# Patient Record
Sex: FEMALE | Race: WHITE | NOT HISPANIC OR LATINO | URBAN - METROPOLITAN AREA
[De-identification: names, ages, dates, MRNs, and addresses within clinical notes are randomized per-mention and may not be internally consistent; named-entity substitution may affect disease eponyms.]

---

## 2017-01-05 ENCOUNTER — APPOINTMENT (OUTPATIENT)
Dept: URBAN - METROPOLITAN AREA CLINIC 219 | Age: 62
Setting detail: DERMATOLOGY
End: 2017-01-05

## 2017-01-05 DIAGNOSIS — L98429 CHRONIC ULCER OF OTHER SPECIFIED SITES: ICD-10-CM

## 2017-01-05 DIAGNOSIS — B35.1 TINEA UNGUIUM: ICD-10-CM

## 2017-01-05 DIAGNOSIS — L98419 CHRONIC ULCER OF OTHER SPECIFIED SITES: ICD-10-CM

## 2017-01-05 DIAGNOSIS — L30.4 ERYTHEMA INTERTRIGO: ICD-10-CM

## 2017-01-05 PROBLEM — L98.491 NON-PRESSURE CHRONIC ULCER OF SKIN OF OTHER SITES LIMITED TO BREAKDOWN OF SKIN: Status: ACTIVE | Noted: 2017-01-05

## 2017-01-05 PROCEDURE — OTHER TREATMENT REGIMEN: OTHER

## 2017-01-05 PROCEDURE — 99212 OFFICE O/P EST SF 10 MIN: CPT

## 2017-01-05 ASSESSMENT — LOCATION ZONE DERM: LOCATION ZONE: TOENAIL

## 2017-01-05 ASSESSMENT — LOCATION DETAILED DESCRIPTION DERM: LOCATION DETAILED: LEFT GREAT TOENAIL

## 2017-01-05 ASSESSMENT — LOCATION SIMPLE DESCRIPTION DERM: LOCATION SIMPLE: LEFT GREAT TOE

## 2017-01-05 NOTE — PROCEDURE: TREATMENT REGIMEN
Plan: Ciclopirox TS twice a day as needed or Penlac Lacquer prescribed by Dr. Abebe
Detail Level: Simple
Plan: Ciclopirox TS twice a day as needed
Otc Regimen: Zeasorb AF Powder twice a day
Detail Level: Detailed

## 2017-07-06 ENCOUNTER — APPOINTMENT (OUTPATIENT)
Dept: URBAN - METROPOLITAN AREA CLINIC 219 | Age: 62
Setting detail: DERMATOLOGY
End: 2017-07-06

## 2017-07-06 DIAGNOSIS — L91.8 OTHER HYPERTROPHIC DISORDERS OF THE SKIN: ICD-10-CM

## 2017-07-06 DIAGNOSIS — L57.0 ACTINIC KERATOSIS: ICD-10-CM

## 2017-07-06 DIAGNOSIS — B35.1 TINEA UNGUIUM: ICD-10-CM

## 2017-07-06 DIAGNOSIS — L72.0 EPIDERMAL CYST: ICD-10-CM

## 2017-07-06 DIAGNOSIS — L30.4 ERYTHEMA INTERTRIGO: ICD-10-CM

## 2017-07-06 PROBLEM — I10 ESSENTIAL (PRIMARY) HYPERTENSION: Status: ACTIVE | Noted: 2017-07-06

## 2017-07-06 PROCEDURE — OTHER REASSURANCE: OTHER

## 2017-07-06 PROCEDURE — 99214 OFFICE O/P EST MOD 30 MIN: CPT | Mod: 25

## 2017-07-06 PROCEDURE — 17000 DESTRUCT PREMALG LESION: CPT

## 2017-07-06 PROCEDURE — OTHER LIQUID NITROGEN: OTHER

## 2017-07-06 PROCEDURE — OTHER TREATMENT REGIMEN: OTHER

## 2017-07-06 ASSESSMENT — LOCATION DETAILED DESCRIPTION DERM
LOCATION DETAILED: RIGHT AXILLARY VAULT
LOCATION DETAILED: RIGHT EYEBROW
LOCATION DETAILED: LEFT AXILLARY VAULT
LOCATION DETAILED: LEFT SUPERIOR FOREHEAD
LOCATION DETAILED: LEFT GREAT TOENAIL

## 2017-07-06 ASSESSMENT — LOCATION ZONE DERM
LOCATION ZONE: AXILLAE
LOCATION ZONE: TOENAIL
LOCATION ZONE: FACE

## 2017-07-06 ASSESSMENT — LOCATION SIMPLE DESCRIPTION DERM
LOCATION SIMPLE: LEFT AXILLARY VAULT
LOCATION SIMPLE: LEFT GREAT TOE
LOCATION SIMPLE: RIGHT AXILLARY VAULT
LOCATION SIMPLE: RIGHT EYEBROW
LOCATION SIMPLE: LEFT FOREHEAD

## 2017-07-06 NOTE — PROCEDURE: TREATMENT REGIMEN
Detail Level: Simple
Otc Regimen: Zeasorb AF Powder twice a day
Continue Regimen: Ciclopirox TS twice a day as needed
Detail Level: Detailed

## 2018-03-20 ENCOUNTER — APPOINTMENT (OUTPATIENT)
Dept: URBAN - METROPOLITAN AREA CLINIC 219 | Age: 63
Setting detail: DERMATOLOGY
End: 2018-03-20

## 2018-03-20 DIAGNOSIS — L82.1 OTHER SEBORRHEIC KERATOSIS: ICD-10-CM

## 2018-03-20 DIAGNOSIS — B35.1 TINEA UNGUIUM: ICD-10-CM

## 2018-03-20 DIAGNOSIS — H02.82 CYSTS OF EYELID: ICD-10-CM

## 2018-03-20 DIAGNOSIS — L30.4 ERYTHEMA INTERTRIGO: ICD-10-CM

## 2018-03-20 PROBLEM — E03.9 HYPOTHYROIDISM, UNSPECIFIED: Status: ACTIVE | Noted: 2018-03-20

## 2018-03-20 PROBLEM — L55.1 SUNBURN OF SECOND DEGREE: Status: ACTIVE | Noted: 2018-03-20

## 2018-03-20 PROBLEM — H02.822 CYSTS OF RIGHT LOWER EYELID: Status: ACTIVE | Noted: 2018-03-20

## 2018-03-20 PROBLEM — Z85.3 PERSONAL HISTORY OF MALIGNANT NEOPLASM OF BREAST: Status: ACTIVE | Noted: 2018-03-20

## 2018-03-20 PROCEDURE — OTHER REASSURANCE: OTHER

## 2018-03-20 PROCEDURE — OTHER MIPS QUALITY: OTHER

## 2018-03-20 PROCEDURE — OTHER TREATMENT REGIMEN: OTHER

## 2018-03-20 PROCEDURE — OTHER INCISION AND DRAINAGE: OTHER

## 2018-03-20 PROCEDURE — 99213 OFFICE O/P EST LOW 20 MIN: CPT | Mod: 25

## 2018-03-20 PROCEDURE — 10060 I&D ABSCESS SIMPLE/SINGLE: CPT

## 2018-03-20 ASSESSMENT — LOCATION SIMPLE DESCRIPTION DERM
LOCATION SIMPLE: RIGHT TEMPLE
LOCATION SIMPLE: LEFT GREAT TOE
LOCATION SIMPLE: RIGHT INFERIOR EYELID

## 2018-03-20 ASSESSMENT — LOCATION DETAILED DESCRIPTION DERM
LOCATION DETAILED: RIGHT MEDIAL INFERIOR EYELID
LOCATION DETAILED: RIGHT MEDIAL TEMPLE
LOCATION DETAILED: LEFT GREAT TOENAIL

## 2018-03-20 ASSESSMENT — LOCATION ZONE DERM
LOCATION ZONE: TOENAIL
LOCATION ZONE: EYELID
LOCATION ZONE: FACE

## 2018-03-20 NOTE — PROCEDURE: INCISION AND DRAINAGE
Include Sutures?: No
Dressing: no dressing
Preparation Text: The area was prepped in the usual clean fashion.
Post-Care Instructions: I reviewed with the patient in detail post-care instructions. Patient should keep wound covered and call the office should any redness, pain, swelling or worsening occur.
Epidermal Sutures: 4-0 Ethilon
Lesion Type: Cyst
Consent was obtained and risks were reviewed including but not limited to delayed wound healing, infection, need for multiple I and D's, and pain.
Anesthesia Volume In Cc: 0
Suture Text: The incision was partially closed with
Detail Level: Detailed
Epidermal Closure: simple interrupted
Method: 11 blade
Size Of Lesion In Cm (Optional But May Be Required For Some Insurances): 0.2
Curette Text (Optional): After the contents were expressed a curette was used to partially remove the cyst wall.

## 2018-03-20 NOTE — PROCEDURE: TREATMENT REGIMEN
Detail Level: Simple
Detail Level: Detailed
Continue Regimen: Ciclopirox TS twice a day as needed\\nZeasorb AF Powder twice a day
Continue Regimen: Ciclopirox TS twice a day as needed

## 2018-09-18 ENCOUNTER — APPOINTMENT (OUTPATIENT)
Dept: URBAN - METROPOLITAN AREA CLINIC 219 | Age: 63
Setting detail: DERMATOLOGY
End: 2018-09-18

## 2018-09-18 DIAGNOSIS — L30.4 ERYTHEMA INTERTRIGO: ICD-10-CM

## 2018-09-18 DIAGNOSIS — B35.1 TINEA UNGUIUM: ICD-10-CM

## 2018-09-18 DIAGNOSIS — L72.0 EPIDERMAL CYST: ICD-10-CM

## 2018-09-18 DIAGNOSIS — L82.1 OTHER SEBORRHEIC KERATOSIS: ICD-10-CM

## 2018-09-18 DIAGNOSIS — D18.0 HEMANGIOMA: ICD-10-CM

## 2018-09-18 DIAGNOSIS — L70.8 OTHER ACNE: ICD-10-CM

## 2018-09-18 PROBLEM — D18.01 HEMANGIOMA OF SKIN AND SUBCUTANEOUS TISSUE: Status: ACTIVE | Noted: 2018-09-18

## 2018-09-18 PROBLEM — I10 ESSENTIAL (PRIMARY) HYPERTENSION: Status: ACTIVE | Noted: 2018-09-18

## 2018-09-18 PROBLEM — I48.91 UNSPECIFIED ATRIAL FIBRILLATION: Status: ACTIVE | Noted: 2018-09-18

## 2018-09-18 PROBLEM — L85.3 XEROSIS CUTIS: Status: ACTIVE | Noted: 2018-09-18

## 2018-09-18 PROCEDURE — OTHER TREATMENT REGIMEN: OTHER

## 2018-09-18 PROCEDURE — 99213 OFFICE O/P EST LOW 20 MIN: CPT

## 2018-09-18 PROCEDURE — OTHER REASSURANCE: OTHER

## 2018-09-18 PROCEDURE — OTHER COUNSELING: OTHER

## 2018-09-18 ASSESSMENT — LOCATION SIMPLE DESCRIPTION DERM
LOCATION SIMPLE: LEFT GREAT TOE
LOCATION SIMPLE: RIGHT PRETIBIAL REGION
LOCATION SIMPLE: RIGHT INFERIOR EYELID
LOCATION SIMPLE: UPPER BACK
LOCATION SIMPLE: LEFT KNEE
LOCATION SIMPLE: RIGHT LOWER BACK
LOCATION SIMPLE: CHEST

## 2018-09-18 ASSESSMENT — LOCATION DETAILED DESCRIPTION DERM
LOCATION DETAILED: RIGHT LATERAL INFERIOR EYELID
LOCATION DETAILED: LEFT KNEE
LOCATION DETAILED: RIGHT INFERIOR MEDIAL MIDBACK
LOCATION DETAILED: INFERIOR THORACIC SPINE
LOCATION DETAILED: LEFT GREAT TOENAIL
LOCATION DETAILED: RIGHT LATERAL SUPERIOR CHEST
LOCATION DETAILED: RIGHT DISTAL PRETIBIAL REGION

## 2018-09-18 ASSESSMENT — LOCATION ZONE DERM
LOCATION ZONE: LEG
LOCATION ZONE: EYELID
LOCATION ZONE: TRUNK
LOCATION ZONE: TOENAIL

## 2021-04-01 ENCOUNTER — APPOINTMENT (OUTPATIENT)
Dept: URBAN - NONMETROPOLITAN AREA CLINIC 45 | Age: 66
Setting detail: DERMATOLOGY
End: 2021-04-01

## 2021-04-01 DIAGNOSIS — L30.4 ERYTHEMA INTERTRIGO: ICD-10-CM

## 2021-04-01 DIAGNOSIS — L72.8 OTHER FOLLICULAR CYSTS OF THE SKIN AND SUBCUTANEOUS TISSUE: ICD-10-CM

## 2021-04-01 PROCEDURE — OTHER INCISION AND DRAINAGE: OTHER

## 2021-04-01 PROCEDURE — 99212 OFFICE O/P EST SF 10 MIN: CPT | Mod: 25

## 2021-04-01 PROCEDURE — 10061 I&D ABSCESS COMP/MULTIPLE: CPT

## 2021-04-01 PROCEDURE — OTHER TREATMENT REGIMEN: OTHER

## 2021-04-01 PROCEDURE — OTHER PRESCRIPTION: OTHER

## 2021-04-01 RX ORDER — CICLOPIROX OLAMINE 7.7 MG/100ML
APPLY SUSPENSION TOPICAL TWICE A DAY
Qty: 1 | Refills: 5 | Status: ERX | COMMUNITY
Start: 2021-04-01

## 2021-04-01 ASSESSMENT — LOCATION ZONE DERM
LOCATION ZONE: FACE
LOCATION ZONE: LIP

## 2021-04-01 ASSESSMENT — LOCATION SIMPLE DESCRIPTION DERM
LOCATION SIMPLE: LEFT CHEEK
LOCATION SIMPLE: LEFT LIP

## 2021-04-01 ASSESSMENT — LOCATION DETAILED DESCRIPTION DERM
LOCATION DETAILED: LEFT MEDIAL BUCCAL CHEEK
LOCATION DETAILED: LEFT LOWER CUTANEOUS LIP

## 2021-04-01 NOTE — PROCEDURE: INCISION AND DRAINAGE
Method: 11 blade
Curette Text (Optional): After the contents were expressed a curette was used to partially remove the cyst wall.
Detail Level: Detailed
I&D: multiple
Consent was obtained and risks were reviewed including but not limited to delayed wound healing, infection, need for multiple I and D's, and pain.
Anesthesia Volume In Cc: 0
Include Sutures?: No
Epidermal Sutures: 4-0 Ethilon
Dressing: dry sterile dressing
Suture Text: The incision was partially closed with
Post-Care Instructions: I reviewed with the patient in detail post-care instructions. Patient should keep wound covered and call the office should any redness, pain, swelling or worsening occur.
Lesion Type: Cysts
Size Of Lesion In Cm (Optional But May Be Required For Some Insurances): 0.4
Epidermal Closure: simple interrupted
Wound Care: Polysporin ointment

## 2021-04-01 NOTE — PROCEDURE: TREATMENT REGIMEN
Detail Level: Simple
Continue Regimen: Ciclopirox TS twice a day as needed\\nZeasorb AF Powder twice a day

## 2021-04-30 ENCOUNTER — OUT OF OFFICE VISIT (OUTPATIENT)
Dept: URBAN - METROPOLITAN AREA MEDICAL CENTER 1 | Facility: MEDICAL CENTER | Age: 66
End: 2021-04-30
Payer: MEDICARE

## 2021-04-30 DIAGNOSIS — R10.84 ABDOMINAL CRAMPING, GENERALIZED: ICD-10-CM

## 2021-04-30 DIAGNOSIS — R93.3 ABN FINDINGS-GI TRACT: ICD-10-CM

## 2021-04-30 DIAGNOSIS — N39.0 CHRONIC UTI: ICD-10-CM

## 2021-04-30 PROCEDURE — G8427 DOCREV CUR MEDS BY ELIG CLIN: HCPCS | Performed by: INTERNAL MEDICINE

## 2021-04-30 PROCEDURE — 99222 1ST HOSP IP/OBS MODERATE 55: CPT | Performed by: INTERNAL MEDICINE

## 2021-04-30 PROCEDURE — 99232 SBSQ HOSP IP/OBS MODERATE 35: CPT | Performed by: INTERNAL MEDICINE

## 2021-05-12 ENCOUNTER — LAB OUTSIDE AN ENCOUNTER (OUTPATIENT)
Dept: URBAN - NONMETROPOLITAN AREA CLINIC 13 | Facility: CLINIC | Age: 66
End: 2021-05-12

## 2021-05-12 ENCOUNTER — OFFICE VISIT (OUTPATIENT)
Dept: URBAN - NONMETROPOLITAN AREA CLINIC 13 | Facility: CLINIC | Age: 66
End: 2021-05-12
Payer: MEDICARE

## 2021-05-12 VITALS
HEIGHT: 64 IN | BODY MASS INDEX: 45.75 KG/M2 | SYSTOLIC BLOOD PRESSURE: 123 MMHG | WEIGHT: 268 LBS | HEART RATE: 81 BPM | DIASTOLIC BLOOD PRESSURE: 81 MMHG

## 2021-05-12 DIAGNOSIS — K52.9 ILEITIS: ICD-10-CM

## 2021-05-12 DIAGNOSIS — Z79.02 LONG TERM (CURRENT) USE OF ANTITHROMBOTICS/ANTIPLATELETS: ICD-10-CM

## 2021-05-12 DIAGNOSIS — E66.9 CLASS 3 OBESITY: ICD-10-CM

## 2021-05-12 DIAGNOSIS — K76.89 HEPATIC CYST: ICD-10-CM

## 2021-05-12 DIAGNOSIS — Z86.010 PERSONAL HISTORY OF COLONIC POLYPS: ICD-10-CM

## 2021-05-12 PROCEDURE — 99214 OFFICE O/P EST MOD 30 MIN: CPT | Performed by: INTERNAL MEDICINE

## 2021-05-12 RX ORDER — METRONIDAZOLE 500 MG/1
1 TABLET TABLET ORAL THREE TIMES A DAY
Status: DISCONTINUED | COMMUNITY

## 2021-05-12 RX ORDER — ATENOLOL 25 MG/1
1 TABLET TABLET ORAL ONCE A DAY
Status: ACTIVE | COMMUNITY

## 2021-05-12 RX ORDER — DILTIAZEM HYDROCHLORIDE 240 MG/1
1 CAPSULE CAPSULE, EXTENDED RELEASE ORAL ONCE A DAY
Status: ACTIVE | COMMUNITY

## 2021-05-12 RX ORDER — WARFARIN SODIUM 2.5 MG/1
1 TABLET TABLET ORAL ONCE A DAY
Status: ACTIVE | COMMUNITY

## 2021-05-12 RX ORDER — POLYETHYLENE GLYCOL 3350, SODIUM SULFATE, SODIUM CHLORIDE, POTASSIUM CHLORIDE, ASCORBIC ACID, SODIUM ASCORBATE 140-9-5.2G
AS DIRECTED KIT ORAL AS DIRECTED
Qty: 280 GRAM | Refills: 0 | OUTPATIENT
Start: 2021-05-12 | End: 2021-05-13

## 2021-05-12 RX ORDER — SIMVASTATIN 10 MG/1
1 TABLET IN THE EVENING TABLET, FILM COATED ORAL ONCE A DAY
Status: ACTIVE | COMMUNITY

## 2021-05-12 RX ORDER — HYDROCHLOROTHIAZIDE 12.5 MG/1
1 CAPSULE IN THE MORNING CAPSULE ORAL ONCE A DAY
Status: ACTIVE | COMMUNITY

## 2021-05-12 RX ORDER — LEVOTHYROXINE SODIUM 0.11 MG/1
1 TABLET IN THE MORNING ON AN EMPTY STOMACH TABLET ORAL ONCE A DAY
Status: ACTIVE | COMMUNITY

## 2021-05-12 NOTE — HPI-TODAY'S VISIT:
2021: Initial Gastroenterology Clinic Visit 65 year old female with past medical history of hypertension, atrial fibrillation on coumadin, breast cancer, diverticulosis, who presents following hospitalization at [facility] from 2021 to May 1, 2021 for right lower quadrant abdominal with findings of terminal ileitis.   Ms. Fitzgerald was in her usual state of health until two days prior to hospitalization when she ate a frozen pizza that led to an upset stomach. She presented to an outside facility and underwent a CT Abdomen/Pelvis which showed evidence of inflammatory changes adjacent to the distal abdominal right ureter secondary to process involving the distal small bowel and cecum with inflammatory changes suggesting ileitis . She was subsequently admitted to [facility].  At [facility], she was unable to produce a stool sample. She was empirically treated with metronidazole + ceftriaxone during hospitalization and was discharged on cefdinir + metronidazole.  At today's visit, she notes her abdominal discomfort has completely resolved. She denies family history of inflammatory bowel disease. She does use coumadin. She denies use of NSAIDs. She has been tolerating a diet since discharge.   Her most recent colonoscopy was performed 10/10/2017 which showed a cecal polyp opposite the ileocecal valve 4 mm sessile treated by biopsy ablation (pathology revealed tubular adenoma), mid sigmoid polyp 4 mm sessile treated by biopsy ablation (pathology revealed hyperplastic polyp), internal hemorrhoids.   Please see below for most recent abdominal impain2021: MRI Liver IMPRESSION: 1. Cyst anterior inferior right lobe of the liver is likely the lesion described on the patient's CT. Other than some minimal anterior septation and appears simple. There also appears to be another tiny cyst slightly more posterior superior aspect inferior right lobe of the liver. 2. MRCP degraded by breathing motion. No biliary dilatation or abnormality of the gallbladder appreciated. 3. Other incidental findings as described.

## 2021-05-12 NOTE — PHYSICAL EXAM GASTROINTESTINAL
Abdomen , Central adiposity appreciated , soft,  nontender, nondistended , no guarding or rigidity , no masses palpable , normal bowel sounds , Liver and Spleen , no hepatomegaly present , no hepatosplenomegaly , liver nontender , spleen not palpable

## 2021-05-13 LAB
A/G RATIO: 1.6
ALBUMIN: 4.1
ALKALINE PHOSPHATASE: 77
ALT (SGPT): 13
AST (SGOT): 24
BASO (ABSOLUTE): 0.1
BASOS: 1
BILIRUBIN, TOTAL: 0.3
BUN/CREATININE RATIO: 20
BUN: 17
C-REACTIVE PROTEIN, QUANT: 9
CALCIUM: 9.8
CARBON DIOXIDE, TOTAL: 23
CHLORIDE: 100
CREATININE: 0.86
EGFR IF AFRICN AM: 81
EGFR IF NONAFRICN AM: 70
EOS (ABSOLUTE): 0.2
EOS: 2
GLOBULIN, TOTAL: 2.5
GLUCOSE: 105
HEMATOCRIT: 48.3
HEMATOLOGY COMMENTS:: (no result)
HEMOGLOBIN: 16
IMMATURE CELLS: (no result)
IMMATURE GRANS (ABS): 0
IMMATURE GRANULOCYTES: 1
LYMPHS (ABSOLUTE): 1.3
LYMPHS: 18
MCH: 29.9
MCHC: 33.1
MCV: 90
MONOCYTES(ABSOLUTE): 0.9
MONOCYTES: 12
NEUTROPHILS (ABSOLUTE): 5.2
NEUTROPHILS: 66
NRBC: (no result)
PLATELETS: 378
POTASSIUM: 4.5
PROTEIN, TOTAL: 6.6
RBC: 5.36
RDW: 14.1
SODIUM: 140
WBC: 7.7

## 2021-07-01 ENCOUNTER — CLAIMS CREATED FROM THE CLAIM WINDOW (OUTPATIENT)
Dept: URBAN - METROPOLITAN AREA CLINIC 4 | Facility: CLINIC | Age: 66
End: 2021-07-01
Payer: MEDICARE

## 2021-07-01 ENCOUNTER — OFFICE VISIT (OUTPATIENT)
Dept: URBAN - NONMETROPOLITAN AREA SURGERY CENTER 1 | Facility: SURGERY CENTER | Age: 66
End: 2021-07-01
Payer: MEDICARE

## 2021-07-01 DIAGNOSIS — K31.89 GASTRIC FOVEOLAR HYPERPLASIA: ICD-10-CM

## 2021-07-01 DIAGNOSIS — K63.5 BENIGN COLON POLYP: ICD-10-CM

## 2021-07-01 DIAGNOSIS — D12.2 ADENOMA OF ASCENDING COLON: ICD-10-CM

## 2021-07-01 DIAGNOSIS — K63.89 POLYP OF ILEUM: ICD-10-CM

## 2021-07-01 DIAGNOSIS — D12.2 BENIGN NEOPLASM OF ASCENDING COLON: ICD-10-CM

## 2021-07-01 DIAGNOSIS — R93.3 ABN FINDINGS-GI TRACT: ICD-10-CM

## 2021-07-01 PROCEDURE — 45380 COLONOSCOPY AND BIOPSY: CPT | Performed by: INTERNAL MEDICINE

## 2021-07-01 PROCEDURE — G8907 PT DOC NO EVENTS ON DISCHARG: HCPCS | Performed by: INTERNAL MEDICINE

## 2021-07-01 PROCEDURE — 45385 COLONOSCOPY W/LESION REMOVAL: CPT | Performed by: INTERNAL MEDICINE

## 2021-07-01 PROCEDURE — 88305 TISSUE EXAM BY PATHOLOGIST: CPT | Performed by: PATHOLOGY

## 2021-07-01 RX ORDER — WARFARIN SODIUM 2.5 MG/1
1 TABLET TABLET ORAL ONCE A DAY
Status: ACTIVE | COMMUNITY

## 2021-07-01 RX ORDER — HYDROCHLOROTHIAZIDE 12.5 MG/1
1 CAPSULE IN THE MORNING CAPSULE ORAL ONCE A DAY
Status: ACTIVE | COMMUNITY

## 2021-07-01 RX ORDER — ATENOLOL 25 MG/1
1 TABLET TABLET ORAL ONCE A DAY
Status: ACTIVE | COMMUNITY

## 2021-07-01 RX ORDER — DILTIAZEM HYDROCHLORIDE 240 MG/1
1 CAPSULE CAPSULE, EXTENDED RELEASE ORAL ONCE A DAY
Status: ACTIVE | COMMUNITY

## 2021-07-01 RX ORDER — LEVOTHYROXINE SODIUM 0.11 MG/1
1 TABLET IN THE MORNING ON AN EMPTY STOMACH TABLET ORAL ONCE A DAY
Status: ACTIVE | COMMUNITY

## 2021-07-01 RX ORDER — SIMVASTATIN 10 MG/1
1 TABLET IN THE EVENING TABLET, FILM COATED ORAL ONCE A DAY
Status: ACTIVE | COMMUNITY

## 2021-07-08 ENCOUNTER — LAB OUTSIDE AN ENCOUNTER (OUTPATIENT)
Dept: URBAN - NONMETROPOLITAN AREA CLINIC 2 | Facility: CLINIC | Age: 66
End: 2021-07-08

## 2021-07-08 ENCOUNTER — TELEPHONE ENCOUNTER (OUTPATIENT)
Dept: URBAN - NONMETROPOLITAN AREA CLINIC 2 | Facility: CLINIC | Age: 66
End: 2021-07-08

## 2021-07-13 ENCOUNTER — OFFICE VISIT (OUTPATIENT)
Dept: URBAN - NONMETROPOLITAN AREA CLINIC 2 | Facility: CLINIC | Age: 66
End: 2021-07-13
Payer: MEDICARE

## 2021-07-13 ENCOUNTER — WEB ENCOUNTER (OUTPATIENT)
Dept: URBAN - NONMETROPOLITAN AREA CLINIC 2 | Facility: CLINIC | Age: 66
End: 2021-07-13

## 2021-07-13 VITALS
DIASTOLIC BLOOD PRESSURE: 85 MMHG | WEIGHT: 262 LBS | HEIGHT: 64 IN | HEART RATE: 65 BPM | SYSTOLIC BLOOD PRESSURE: 124 MMHG | BODY MASS INDEX: 44.73 KG/M2

## 2021-07-13 DIAGNOSIS — K52.9 ILEITIS: ICD-10-CM

## 2021-07-13 DIAGNOSIS — Z86.010 PERSONAL HISTORY OF COLONIC POLYPS: ICD-10-CM

## 2021-07-13 DIAGNOSIS — Z79.02 LONG TERM (CURRENT) USE OF ANTITHROMBOTICS/ANTIPLATELETS: ICD-10-CM

## 2021-07-13 DIAGNOSIS — E66.9 CLASS 3 OBESITY: ICD-10-CM

## 2021-07-13 DIAGNOSIS — K76.89 HEPATIC CYST: ICD-10-CM

## 2021-07-13 PROCEDURE — 99213 OFFICE O/P EST LOW 20 MIN: CPT | Performed by: INTERNAL MEDICINE

## 2021-07-13 RX ORDER — HYDROCHLOROTHIAZIDE 12.5 MG/1
1 CAPSULE IN THE MORNING CAPSULE ORAL ONCE A DAY
Status: ACTIVE | COMMUNITY

## 2021-07-13 RX ORDER — ATENOLOL 25 MG/1
1 TABLET TABLET ORAL ONCE A DAY
Status: ACTIVE | COMMUNITY

## 2021-07-13 RX ORDER — LEVOTHYROXINE SODIUM 0.11 MG/1
1 TABLET IN THE MORNING ON AN EMPTY STOMACH TABLET ORAL ONCE A DAY
Status: ACTIVE | COMMUNITY

## 2021-07-13 RX ORDER — SIMVASTATIN 10 MG/1
1 TABLET IN THE EVENING TABLET, FILM COATED ORAL ONCE A DAY
Status: ACTIVE | COMMUNITY

## 2021-07-13 RX ORDER — WARFARIN SODIUM 2.5 MG/1
1 TABLET TABLET ORAL ONCE A DAY
Status: ACTIVE | COMMUNITY

## 2021-07-13 RX ORDER — DILTIAZEM HYDROCHLORIDE 240 MG/1
1 CAPSULE CAPSULE, EXTENDED RELEASE ORAL ONCE A DAY
Status: ACTIVE | COMMUNITY

## 2021-07-13 NOTE — HPI-TODAY'S VISIT:
2021: Initial Gastroenterology Clinic Visit   65 year old female with past medical history of hypertension, atrial fibrillation on coumadin, breast cancer, diverticulosis, who presents following hospitalization at [facility] from 2021 to May 1, 2021 for right lower quadrant abdominal with findings of terminal ileitis.   Ms. Fitzgerald was in her usual state of health until two days prior to hospitalization when she ate a frozen pizza that led to an upset stomach. She presented to an outside facility and underwent a CT Abdomen/Pelvis which showed evidence of inflammatory changes adjacent to the distal abdominal right ureter secondary to process involving the distal small bowel and cecum with inflammatory changes suggesting ileitis . She was subsequently admitted to [facility].  At [facility], she was unable to produce a stool sample. She was empirically treated with metronidazole + ceftriaxone during hospitalization and was discharged on cefdinir + metronidazole.  At today's visit, she notes her abdominal discomfort has completely resolved. She denies family history of inflammatory bowel disease. She does use coumadin. She denies use of NSAIDs. She has been tolerating a diet since discharge.   Her most recent colonoscopy was performed 10/10/2017 which showed a cecal polyp opposite the ileocecal valve 4 mm sessile treated by biopsy ablation (pathology revealed tubular adenoma), mid sigmoid polyp 4 mm sessile treated by biopsy ablation (pathology revealed hyperplastic polyp), internal hemorrhoids.   Please see below for most recent abdominal impain2021: MRI Liver IMPRESSION: 1. Cyst anterior inferior right lobe of the liver is likely the lesion described on the patient's CT. Other than some minimal anterior septation and appears simple. There also appears to be another tiny cyst slightly more posterior superior aspect inferior right lobe of the liver. 2. MRCP degraded by breathing motion. No biliary dilatation or abnormality of the gallbladder appreciated. 3. Other incidental findings as described.  2021: WBC 7.7, hemoglobin 16.0, hematocrit 48.3, platelets 378. Chemistry panel and LFTs normal. CRP normal. 2021: CT-Enterography IMPRESSION: 1. Hepatic cyst. There is a little bit of periportal edema. There is a broad differential for this. Differential includes, but is not limited to such things as aggressive rehydration and hepatitis. 2. No bowel obstruction. No focal bowel wall thickening or mucosal hyperemia to suggest acute colitis. 2021: Colonoscopy   The terminal ileum was normal. Biopsied. Seven polyps were found in the ascending colon ranging from 2 to 3 mm in size. Polypectomy performed. 5 mm polyp in the ascending colon. Polypectomy performed. A few small and large mouthed diverticula were found in the sigmoid colon. 3 mm polyp was found in the rectum. Polypectomy performed. Retroflexion in the rectum was normal.   2021: Pathology from Colonoscopy  (A) Terminal Ileum, Biopsy: NO SIGNIFICANT ABNORMALITY. (B) Colon, Ascending, Polypectomy: TUBULAR ADENOMA(S). (C) Rectum, Polypectomy: HYPERPLASTIC POLYP(S).  2021: Gastroenterology Clinic Follow-Up Ms. Arreola denies abdominal pain, nausea, vomiting, diarrhea. She states she has been attempting weight loss.

## 2022-03-08 ENCOUNTER — WEB ENCOUNTER (OUTPATIENT)
Dept: URBAN - NONMETROPOLITAN AREA CLINIC 2 | Facility: CLINIC | Age: 67
End: 2022-03-08

## 2022-03-14 ENCOUNTER — LAB OUTSIDE AN ENCOUNTER (OUTPATIENT)
Dept: URBAN - NONMETROPOLITAN AREA CLINIC 2 | Facility: CLINIC | Age: 67
End: 2022-03-14

## 2022-03-14 ENCOUNTER — OFFICE VISIT (OUTPATIENT)
Dept: URBAN - NONMETROPOLITAN AREA CLINIC 2 | Facility: CLINIC | Age: 67
End: 2022-03-14
Payer: MEDICARE

## 2022-03-14 VITALS
SYSTOLIC BLOOD PRESSURE: 127 MMHG | WEIGHT: 262 LBS | DIASTOLIC BLOOD PRESSURE: 80 MMHG | BODY MASS INDEX: 44.73 KG/M2 | HEIGHT: 64 IN

## 2022-03-14 DIAGNOSIS — Z86.010 PERSONAL HISTORY OF COLONIC POLYPS: ICD-10-CM

## 2022-03-14 DIAGNOSIS — K52.9 ILEITIS: ICD-10-CM

## 2022-03-14 DIAGNOSIS — E66.9 CLASS 3 OBESITY: ICD-10-CM

## 2022-03-14 DIAGNOSIS — Z79.02 LONG TERM (CURRENT) USE OF ANTITHROMBOTICS/ANTIPLATELETS: ICD-10-CM

## 2022-03-14 DIAGNOSIS — K76.89 HEPATIC CYST: ICD-10-CM

## 2022-03-14 PROCEDURE — 99214 OFFICE O/P EST MOD 30 MIN: CPT | Performed by: INTERNAL MEDICINE

## 2022-03-14 RX ORDER — WARFARIN SODIUM 2.5 MG/1
1 TABLET TABLET ORAL ONCE A DAY
Status: ACTIVE | COMMUNITY

## 2022-03-14 RX ORDER — SIMVASTATIN 10 MG/1
1 TABLET IN THE EVENING TABLET, FILM COATED ORAL ONCE A DAY
Status: ACTIVE | COMMUNITY

## 2022-03-14 RX ORDER — LEVOTHYROXINE SODIUM 0.11 MG/1
1 TABLET IN THE MORNING ON AN EMPTY STOMACH TABLET ORAL ONCE A DAY
Status: ACTIVE | COMMUNITY

## 2022-03-14 RX ORDER — HYDROCHLOROTHIAZIDE 12.5 MG/1
1 CAPSULE IN THE MORNING CAPSULE ORAL ONCE A DAY
Status: ACTIVE | COMMUNITY

## 2022-03-14 RX ORDER — POLYETHYLENE GLYCOL 3350, SODIUM SULFATE, SODIUM CHLORIDE, POTASSIUM CHLORIDE, ASCORBIC ACID, SODIUM ASCORBATE 140-9-5.2G
AS DIRECTED KIT ORAL AS DIRECTED
Qty: 280 GRAM | Refills: 0 | OUTPATIENT
Start: 2022-03-14 | End: 2022-03-15

## 2022-03-14 RX ORDER — DILTIAZEM HYDROCHLORIDE 240 MG/1
1 CAPSULE CAPSULE, EXTENDED RELEASE ORAL ONCE A DAY
Status: ACTIVE | COMMUNITY

## 2022-03-14 RX ORDER — ATENOLOL 25 MG/1
1 TABLET TABLET ORAL ONCE A DAY
Status: ACTIVE | COMMUNITY

## 2022-03-14 NOTE — HPI-TODAY'S VISIT:
2021: Initial Gastroenterology Clinic Visit      65 year old female with past medical history of hypertension, atrial fibrillation on coumadin, breast cancer, diverticulosis, who presents following hospitalization at [facility] from 2021 to May 1, 2021 for right lower quadrant abdominal discomfort with findings of terminal ileitis.   Ms. Fitzgerald was in her usual state of health until two days prior to hospitalization when she ate a frozen pizza that led to an upset stomach. She presented to an outside facility and underwent a CT Abdomen/Pelvis which showed evidence of inflammatory changes adjacent to the distal abdominal right ureter secondary to process involving the distal small bowel and cecum with inflammatory changes suggesting ileitis . She was subsequently admitted to [facility].  At [facility], she was unable to produce a stool sample. She was empirically treated with metronidazole + ceftriaxone during hospitalization and was discharged on cefdinir + metronidazole.  At today's visit, she notes her abdominal discomfort has completely resolved. She denies family history of inflammatory bowel disease. She does use coumadin. She denies use of NSAIDs. She has been tolerating a diet since discharge.   Her most recent colonoscopy was performed 10/10/2017 which showed a cecal polyp opposite the ileocecal valve 4 mm sessile treated by biopsy ablation (pathology revealed tubular adenoma), mid sigmoid polyp 4 mm sessile treated by biopsy ablation (pathology revealed hyperplastic polyp), internal hemorrhoids.   Please see below for most recent abdominal impain2021: MRI Liver IMPRESSION: 1. Cyst anterior inferior right lobe of the liver is likely the lesion described on the patient's CT. Other than some minimal anterior septation and appears simple. There also appears to be another tiny cyst slightly more posterior superior aspect inferior right lobe of the liver. 2. MRCP degraded by breathing motion. No biliary dilatation or abnormality of the gallbladder appreciated. 3. Other incidental findings as described.  2021: WBC 7.7, hemoglobin 16.0, hematocrit 48.3, platelets 378. Chemistry panel and LFTs normal. CRP normal. 2021: CT-Enterography IMPRESSION: 1. Hepatic cyst. There is a little bit of periportal edema. There is a broad differential for this. Differential includes, but is not limited to such things as aggressive rehydration and hepatitis. 2. No bowel obstruction. No focal bowel wall thickening or mucosal hyperemia to suggest acute colitis. 2021: Colonoscopy   The terminal ileum was normal. Biopsied. Seven polyps were found in the ascending colon ranging from 2 to 3 mm in size. Polypectomy performed. 5 mm polyp in the ascending colon. Polypectomy performed. A few small and large mouthed diverticula were found in the sigmoid colon. 3 mm polyp was found in the rectum. Polypectomy performed. Retroflexion in the rectum was normal.   2021: Pathology from Colonoscopy  (A) Terminal Ileum, Biopsy: NO SIGNIFICANT ABNORMALITY. (B) Colon, Ascending, Polypectomy: TUBULAR ADENOMA(S). (C) Rectum, Polypectomy: HYPERPLASTIC POLYP(S).  2021: Gastroenterology Clinic Follow-Up Ms. Arreola denies abdominal pain, nausea, vomiting, diarrhea. She states she has been attempting weight loss.  3/14/2022: Gastroenterology Clinic Follow-Up Denies melena, hematochezia. She has been able to lose 30 pounds since her last clinic visit. Denies significant abdominal pain or discomfort. She had not had any recent changes in medication or new surgeries. She is interested in pursuing repeat MRI to evaluate the liver lesion.

## 2022-06-24 ENCOUNTER — TELEPHONE ENCOUNTER (OUTPATIENT)
Dept: URBAN - NONMETROPOLITAN AREA CLINIC 2 | Facility: CLINIC | Age: 67
End: 2022-06-24

## 2022-07-08 ENCOUNTER — OFFICE VISIT (OUTPATIENT)
Dept: URBAN - NONMETROPOLITAN AREA SURGERY CENTER 1 | Facility: SURGERY CENTER | Age: 67
End: 2022-07-08
Payer: MEDICARE

## 2022-07-08 ENCOUNTER — CLAIMS CREATED FROM THE CLAIM WINDOW (OUTPATIENT)
Dept: URBAN - METROPOLITAN AREA CLINIC 4 | Facility: CLINIC | Age: 67
End: 2022-07-08
Payer: MEDICARE

## 2022-07-08 DIAGNOSIS — D12.4 BENIGN NEOPLASM OF DESCENDING COLON: ICD-10-CM

## 2022-07-08 DIAGNOSIS — K63.89 BACTERIAL OVERGROWTH SYNDROME: ICD-10-CM

## 2022-07-08 DIAGNOSIS — Z86.010 ADENOMAS PERSONAL HISTORY OF COLONIC POLYPS: ICD-10-CM

## 2022-07-08 DIAGNOSIS — K63.89 OTHER SPECIFIED DISEASES OF INTESTINE: ICD-10-CM

## 2022-07-08 DIAGNOSIS — D12.4 ADENOMA OF DESCENDING COLON: ICD-10-CM

## 2022-07-08 PROCEDURE — 88305 TISSUE EXAM BY PATHOLOGIST: CPT | Performed by: PATHOLOGY

## 2022-07-08 PROCEDURE — 45380 COLONOSCOPY AND BIOPSY: CPT | Performed by: INTERNAL MEDICINE

## 2022-07-08 PROCEDURE — 45385 COLONOSCOPY W/LESION REMOVAL: CPT | Performed by: INTERNAL MEDICINE

## 2022-07-08 PROCEDURE — G8907 PT DOC NO EVENTS ON DISCHARG: HCPCS | Performed by: INTERNAL MEDICINE

## 2022-07-08 RX ORDER — HYDROCHLOROTHIAZIDE 12.5 MG/1
1 CAPSULE IN THE MORNING CAPSULE ORAL ONCE A DAY
Status: ACTIVE | COMMUNITY

## 2022-07-08 RX ORDER — ATENOLOL 25 MG/1
1 TABLET TABLET ORAL ONCE A DAY
Status: ACTIVE | COMMUNITY

## 2022-07-08 RX ORDER — SIMVASTATIN 10 MG/1
1 TABLET IN THE EVENING TABLET, FILM COATED ORAL ONCE A DAY
Status: ACTIVE | COMMUNITY

## 2022-07-08 RX ORDER — DILTIAZEM HYDROCHLORIDE 240 MG/1
1 CAPSULE CAPSULE, EXTENDED RELEASE ORAL ONCE A DAY
Status: ACTIVE | COMMUNITY

## 2022-07-08 RX ORDER — LEVOTHYROXINE SODIUM 0.11 MG/1
1 TABLET IN THE MORNING ON AN EMPTY STOMACH TABLET ORAL ONCE A DAY
Status: ACTIVE | COMMUNITY

## 2022-07-08 RX ORDER — WARFARIN SODIUM 2.5 MG/1
1 TABLET TABLET ORAL ONCE A DAY
Status: ACTIVE | COMMUNITY

## 2022-07-18 ENCOUNTER — OFFICE VISIT (OUTPATIENT)
Dept: URBAN - NONMETROPOLITAN AREA CLINIC 2 | Facility: CLINIC | Age: 67
End: 2022-07-18
Payer: MEDICARE

## 2022-07-18 VITALS
WEIGHT: 263 LBS | BODY MASS INDEX: 44.9 KG/M2 | HEIGHT: 64 IN | DIASTOLIC BLOOD PRESSURE: 71 MMHG | HEART RATE: 68 BPM | SYSTOLIC BLOOD PRESSURE: 109 MMHG

## 2022-07-18 DIAGNOSIS — Z86.010 PERSONAL HISTORY OF COLONIC POLYPS: ICD-10-CM

## 2022-07-18 DIAGNOSIS — K59.00 CONSTIPATION, UNSPECIFIED CONSTIPATION TYPE: ICD-10-CM

## 2022-07-18 DIAGNOSIS — K76.89 HEPATIC CYST: ICD-10-CM

## 2022-07-18 DIAGNOSIS — Z79.02 LONG TERM (CURRENT) USE OF ANTITHROMBOTICS/ANTIPLATELETS: ICD-10-CM

## 2022-07-18 DIAGNOSIS — E66.9 CLASS 3 OBESITY: ICD-10-CM

## 2022-07-18 DIAGNOSIS — K52.9 ILEITIS: ICD-10-CM

## 2022-07-18 PROCEDURE — 99214 OFFICE O/P EST MOD 30 MIN: CPT | Performed by: INTERNAL MEDICINE

## 2022-07-18 RX ORDER — DILTIAZEM HYDROCHLORIDE 240 MG/1
1 CAPSULE CAPSULE, EXTENDED RELEASE ORAL ONCE A DAY
Status: ACTIVE | COMMUNITY

## 2022-07-18 RX ORDER — HYDROCHLOROTHIAZIDE 12.5 MG/1
1 CAPSULE IN THE MORNING CAPSULE ORAL ONCE A DAY
Status: ACTIVE | COMMUNITY

## 2022-07-18 RX ORDER — ATENOLOL 25 MG/1
1 TABLET TABLET ORAL ONCE A DAY
Status: ACTIVE | COMMUNITY

## 2022-07-18 RX ORDER — SIMVASTATIN 10 MG/1
1 TABLET IN THE EVENING TABLET, FILM COATED ORAL ONCE A DAY
Status: ACTIVE | COMMUNITY

## 2022-07-18 RX ORDER — WARFARIN SODIUM 2.5 MG/1
1 TABLET TABLET ORAL ONCE A DAY
Status: ACTIVE | COMMUNITY

## 2022-07-18 RX ORDER — LEVOTHYROXINE SODIUM 0.11 MG/1
1 TABLET IN THE MORNING ON AN EMPTY STOMACH TABLET ORAL ONCE A DAY
Status: ACTIVE | COMMUNITY

## 2022-07-18 NOTE — HPI-TODAY'S VISIT:
2021: Initial Gastroenterology Clinic Visit        65 year old female with past medical history of hypertension, atrial fibrillation on coumadin, breast cancer, diverticulosis, who presents following hospitalization at [facility] from 2021 to May 1, 2021 for right lower quadrant abdominal discomfort with findings of terminal ileitis.   Ms. Fitzgerald was in her usual state of health until two days prior to hospitalization when she ate a frozen pizza that led to an upset stomach. She presented to an outside facility and underwent a CT Abdomen/Pelvis which showed evidence of inflammatory changes adjacent to the distal abdominal right ureter secondary to process involving the distal small bowel and cecum with inflammatory changes suggesting ileitis . She was subsequently admitted to [facility].  At [facility], she was unable to produce a stool sample. She was empirically treated with metronidazole + ceftriaxone during hospitalization and was discharged on cefdinir + metronidazole.  At today's visit, she notes her abdominal discomfort has completely resolved. She denies family history of inflammatory bowel disease. She does use coumadin. She denies use of NSAIDs. She has been tolerating a diet since discharge.   Her most recent colonoscopy was performed 10/10/2017 which showed a cecal polyp opposite the ileocecal valve 4 mm sessile treated by biopsy ablation (pathology revealed tubular adenoma), mid sigmoid polyp 4 mm sessile treated by biopsy ablation (pathology revealed hyperplastic polyp), internal hemorrhoids.   Please see below for most recent abdominal impain2021: MRI Liver IMPRESSION: 1. Cyst anterior inferior right lobe of the liver is likely the lesion described on the patient's CT. Other than some minimal anterior septation and appears simple. There also appears to be another tiny cyst slightly more posterior superior aspect inferior right lobe of the liver. 2. MRCP degraded by breathing motion. No biliary dilatation or abnormality of the gallbladder appreciated. 3. Other incidental findings as described.  2021: WBC 7.7, hemoglobin 16.0, hematocrit 48.3, platelets 378. Chemistry panel and LFTs normal. CRP normal. 2021: CT-Enterography IMPRESSION: 1. Hepatic cyst. There is a little bit of periportal edema. There is a broad differential for this. Differential includes, but is not limited to such things as aggressive rehydration and hepatitis. 2. No bowel obstruction. No focal bowel wall thickening or mucosal hyperemia to suggest acute colitis. 2021: Colonoscopy   The terminal ileum was normal. Biopsied. Seven polyps were found in the ascending colon ranging from 2 to 3 mm in size. Polypectomy performed. 5 mm polyp in the ascending colon. Polypectomy performed. A few small and large mouthed diverticula were found in the sigmoid colon. 3 mm polyp was found in the rectum. Polypectomy performed. Retroflexion in the rectum was normal.   2021: Pathology from Colonoscopy  (A) Terminal Ileum, Biopsy: NO SIGNIFICANT ABNORMALITY. (B) Colon, Ascending, Polypectomy: TUBULAR ADENOMA(S). (C) Rectum, Polypectomy: HYPERPLASTIC POLYP(S).  2021: Gastroenterology Clinic Follow-Up Ms. Arreola denies abdominal pain, nausea, vomiting, diarrhea. She states she has been attempting weight loss.  3/14/2022: Gastroenterology Clinic Follow-Up Denies melena, hematochezia. She has been able to lose 30 pounds since her last clinic visit. Denies significant abdominal pain or discomfort. She had not had any recent changes in medication or new surgeries. She is interested in pursuing repeat MRI to evaluate the liver lesion.  3/30/2022: MRI Liver IMPRESSION: 1.  Stable right hepatic lobe cysts. 2.  No suspicious hepatic lesion. 2022: Colonoscopy Copious quantities of solid stool was found in the ascending colon and cecum precluding visualization.  2 mm polyp in the ascending colon. Polypectomy performed. 3 mm polyp in the descending colon. Polypectomy performed. 2022: Pathology from Colonoscopy FINAL DIAGNOSIS (A) Colon, Ascending, Polypectomy: BENIGN MUCOSAL POLYP(S). See Comment. Negative for Dysplasia or Malignancy. COMMENT: We use "benign mucosal polyp" to refer to an endoscopically polypoid lesion that shows no dysplastic or hyperplastic epithelium or classic features of hamartomatous polyp. (B) Colon, Descending, Polypectomy: TUBULAR ADENOMA(S).  2022: Gastroenterology Follow-Up Visit Ms. Arreola did not feel as though her bowels were completely empty prior to her colonosocpyin 2022. Denies melena, hematochezia, unintentional weight loss.

## 2022-07-26 ENCOUNTER — DASHBOARD ENCOUNTERS (OUTPATIENT)
Age: 67
End: 2022-07-26

## 2022-07-26 PROBLEM — 52457000: Status: ACTIVE | Noted: 2021-05-12

## 2022-07-26 PROBLEM — 819948005: Status: ACTIVE | Noted: 2021-05-12

## 2022-07-26 PROBLEM — 428283002: Status: ACTIVE | Noted: 2021-05-12

## 2022-07-26 PROBLEM — 14760008: Status: ACTIVE | Noted: 2022-07-26

## 2022-07-26 PROBLEM — 305058001: Status: ACTIVE | Noted: 2021-05-12

## 2022-07-26 PROBLEM — 85057007: Status: ACTIVE | Noted: 2021-05-12

## 2022-08-12 ENCOUNTER — APPOINTMENT (OUTPATIENT)
Dept: URBAN - NONMETROPOLITAN AREA CLINIC 45 | Age: 67
Setting detail: DERMATOLOGY
End: 2022-08-12

## 2022-08-12 DIAGNOSIS — L30.4 ERYTHEMA INTERTRIGO: ICD-10-CM

## 2022-08-12 DIAGNOSIS — L82.0 INFLAMED SEBORRHEIC KERATOSIS: ICD-10-CM

## 2022-08-12 DIAGNOSIS — L56.8 OTHER SPECIFIED ACUTE SKIN CHANGES DUE TO ULTRAVIOLET RADIATION: ICD-10-CM

## 2022-08-12 PROBLEM — L30.9 DERMATITIS, UNSPECIFIED: Status: ACTIVE | Noted: 2022-08-12

## 2022-08-12 PROCEDURE — OTHER LIQUID NITROGEN: OTHER

## 2022-08-12 PROCEDURE — OTHER PRESCRIPTION MEDICATION MANAGEMENT: OTHER

## 2022-08-12 PROCEDURE — OTHER PRESCRIPTION: OTHER

## 2022-08-12 PROCEDURE — 17110 DESTRUCT B9 LESION 1-14: CPT

## 2022-08-12 PROCEDURE — OTHER MIPS QUALITY: OTHER

## 2022-08-12 PROCEDURE — 99214 OFFICE O/P EST MOD 30 MIN: CPT | Mod: 25

## 2022-08-12 RX ORDER — TRIAMCINOLONE ACETONIDE 1 MG/G
APPLY CREAM TOPICAL BID
Qty: 1 | Refills: 3 | Status: ERX | COMMUNITY
Start: 2022-08-12

## 2022-08-12 ASSESSMENT — LOCATION ZONE DERM
LOCATION ZONE: ARM
LOCATION ZONE: LEG

## 2022-08-12 ASSESSMENT — LOCATION DETAILED DESCRIPTION DERM
LOCATION DETAILED: LEFT DISTAL DORSAL FOREARM
LOCATION DETAILED: LEFT ANTERIOR DISTAL THIGH
LOCATION DETAILED: LEFT ANTERIOR PROXIMAL THIGH
LOCATION DETAILED: RIGHT DISTAL DORSAL FOREARM

## 2022-08-12 ASSESSMENT — LOCATION SIMPLE DESCRIPTION DERM
LOCATION SIMPLE: RIGHT FOREARM
LOCATION SIMPLE: LEFT FOREARM
LOCATION SIMPLE: LEFT THIGH

## 2022-08-12 NOTE — HPI: OTHER
Condition:: Skin Irritation
Please Describe Your Condition:: is an established patient who is being seen for a work in with a chief complaint of Skin Irritation on the arms. Pt states the irritation is red and itchy. Pt states it has been going on for 4 months now. Pt is here today for Evaluation and treatment.

## 2022-08-12 NOTE — PROCEDURE: LIQUID NITROGEN
Post-Care Instructions: I reviewed with the patient in detail post-care instructions. Patient is to wear sunprotection, and avoid picking at any of the treated lesions. Pt may apply Vaseline to crusted or scabbing areas.
Show Spray Paint Technique Variable?: Yes
Detail Level: Detailed
Spray Paint Text: The liquid nitrogen was applied to the skin utilizing a spray paint frosting technique.
Add 52 Modifier (Optional): no
Consent: The patient's consent was obtained including but not limited to risks of crusting, scabbing, blistering, scarring, darker or lighter pigmentary change, recurrence, incomplete removal and infection.
Medical Necessity Information: It is in your best interest to select a reason for this procedure from the list below. All of these items fulfill various CMS LCD requirements except the new and changing color options.
Medical Necessity Clause: This procedure was medically necessary because the lesions that were treated were:

## 2022-08-12 NOTE — PROCEDURE: PRESCRIPTION MEDICATION MANAGEMENT
Detail Level: Zone
Render In Strict Bullet Format?: No
Continue Regimen: Ciclopirox TS twice a day as needed \\nDrying technique \\nZeasorb AF Powder
Plan: Triamcinolone Cream twice a day x4 weeks then only 1-2 times a week as needed\\nIncrease moisturizers \\nMild cleansers ( Dove Fragrance Free Bar Soap)\\nEucerin Mineral 50 spf sensitive skin sunscreen ( samples given )

## 2022-09-30 ENCOUNTER — OFFICE VISIT (OUTPATIENT)
Dept: URBAN - NONMETROPOLITAN AREA SURGERY CENTER 1 | Facility: SURGERY CENTER | Age: 67
End: 2022-09-30
Payer: MEDICARE

## 2022-09-30 ENCOUNTER — CLAIMS CREATED FROM THE CLAIM WINDOW (OUTPATIENT)
Dept: URBAN - METROPOLITAN AREA CLINIC 4 | Facility: CLINIC | Age: 67
End: 2022-09-30
Payer: MEDICARE

## 2022-09-30 DIAGNOSIS — K63.89 OTHER SPECIFIED DISEASES OF INTESTINE: ICD-10-CM

## 2022-09-30 DIAGNOSIS — Z86.010 ADENOMAS PERSONAL HISTORY OF COLONIC POLYPS: ICD-10-CM

## 2022-09-30 DIAGNOSIS — D12.2 BENIGN NEOPLASM OF ASCENDING COLON: ICD-10-CM

## 2022-09-30 DIAGNOSIS — D12.2 ADENOMA OF ASCENDING COLON: ICD-10-CM

## 2022-09-30 PROCEDURE — 45380 COLONOSCOPY AND BIOPSY: CPT | Performed by: INTERNAL MEDICINE

## 2022-09-30 PROCEDURE — G8907 PT DOC NO EVENTS ON DISCHARG: HCPCS | Performed by: INTERNAL MEDICINE

## 2022-09-30 PROCEDURE — 88305 TISSUE EXAM BY PATHOLOGIST: CPT | Performed by: PATHOLOGY

## 2022-09-30 RX ORDER — ATENOLOL 25 MG/1
1 TABLET TABLET ORAL ONCE A DAY
Status: ACTIVE | COMMUNITY

## 2022-09-30 RX ORDER — LEVOTHYROXINE SODIUM 0.11 MG/1
1 TABLET IN THE MORNING ON AN EMPTY STOMACH TABLET ORAL ONCE A DAY
Status: ACTIVE | COMMUNITY

## 2022-09-30 RX ORDER — DILTIAZEM HYDROCHLORIDE 240 MG/1
1 CAPSULE CAPSULE, EXTENDED RELEASE ORAL ONCE A DAY
Status: ACTIVE | COMMUNITY

## 2022-09-30 RX ORDER — HYDROCHLOROTHIAZIDE 12.5 MG/1
1 CAPSULE IN THE MORNING CAPSULE ORAL ONCE A DAY
Status: ACTIVE | COMMUNITY

## 2022-09-30 RX ORDER — WARFARIN SODIUM 2.5 MG/1
1 TABLET TABLET ORAL ONCE A DAY
Status: ACTIVE | COMMUNITY

## 2022-09-30 RX ORDER — SIMVASTATIN 10 MG/1
1 TABLET IN THE EVENING TABLET, FILM COATED ORAL ONCE A DAY
Status: ACTIVE | COMMUNITY

## 2022-12-12 ENCOUNTER — APPOINTMENT (OUTPATIENT)
Dept: URBAN - NONMETROPOLITAN AREA CLINIC 45 | Age: 67
Setting detail: DERMATOLOGY
End: 2022-12-12

## 2022-12-12 DIAGNOSIS — L30.4 ERYTHEMA INTERTRIGO: ICD-10-CM

## 2022-12-12 DIAGNOSIS — L82.0 INFLAMED SEBORRHEIC KERATOSIS: ICD-10-CM

## 2022-12-12 DIAGNOSIS — L259 CONTACT DERMATITIS AND OTHER ECZEMA, UNSPECIFIED CAUSE: ICD-10-CM

## 2022-12-12 PROBLEM — L30.8 OTHER SPECIFIED DERMATITIS: Status: ACTIVE | Noted: 2022-12-12

## 2022-12-12 PROCEDURE — OTHER PRESCRIPTION: OTHER

## 2022-12-12 PROCEDURE — 99213 OFFICE O/P EST LOW 20 MIN: CPT | Mod: 25

## 2022-12-12 PROCEDURE — 17110 DESTRUCT B9 LESION 1-14: CPT

## 2022-12-12 PROCEDURE — OTHER MIPS QUALITY: OTHER

## 2022-12-12 PROCEDURE — OTHER LIQUID NITROGEN: OTHER

## 2022-12-12 PROCEDURE — OTHER PRESCRIPTION MEDICATION MANAGEMENT: OTHER

## 2022-12-12 RX ORDER — TRIAMCINOLONE ACETONIDE 1 MG/G
APPLY CREAM TOPICAL BID
Qty: 1 | Refills: 3 | Status: ERX

## 2022-12-12 ASSESSMENT — LOCATION DETAILED DESCRIPTION DERM
LOCATION DETAILED: RIGHT DISTAL DORSAL FOREARM
LOCATION DETAILED: LEFT AXILLARY VAULT
LOCATION DETAILED: RIGHT PROXIMAL PRETIBIAL REGION
LOCATION DETAILED: LEFT DISTAL DORSAL FOREARM
LOCATION DETAILED: RIGHT DISTAL PRETIBIAL REGION

## 2022-12-12 ASSESSMENT — LOCATION ZONE DERM
LOCATION ZONE: LEG
LOCATION ZONE: ARM
LOCATION ZONE: AXILLAE

## 2022-12-12 ASSESSMENT — LOCATION SIMPLE DESCRIPTION DERM
LOCATION SIMPLE: RIGHT PRETIBIAL REGION
LOCATION SIMPLE: LEFT AXILLARY VAULT
LOCATION SIMPLE: LEFT FOREARM
LOCATION SIMPLE: RIGHT FOREARM

## 2022-12-12 NOTE — PROCEDURE: PRESCRIPTION MEDICATION MANAGEMENT
Detail Level: Zone
Render In Strict Bullet Format?: No
Continue Regimen: Ciclopirox TS twice a day as needed \\nDrying technique \\nZeasorb AF Powder
Plan: Triamcinolone Cream twice a day x2 weeks  for flares then only 1-2 times a week as needed\\nIncrease moisturizers ( Cerave cream, Aquaphor or Vaseline ) \\nMild cleansers ( Dove Fragrance Free Bar Soap) ( avoid bath and Body works fragrance soap )

## 2022-12-12 NOTE — PROCEDURE: LIQUID NITROGEN
Show Spray Paint Technique Variable?: Yes
Include Z78.9 (Other Specified Conditions Influencing Health Status) As An Associated Diagnosis?: No
Post-Care Instructions: I reviewed with the patient in detail post-care instructions. Patient is to wear sunprotection, and avoid picking at any of the treated lesions. Pt may apply Vaseline to crusted or scabbing areas.
Spray Paint Text: The liquid nitrogen was applied to the skin utilizing a spray paint frosting technique.
Consent: The patient's consent was obtained including but not limited to risks of crusting, scabbing, blistering, scarring, darker or lighter pigmentary change, recurrence, incomplete removal and infection.
Medical Necessity Clause: This procedure was medically necessary because the lesions that were treated were:
Medical Necessity Information: It is in your best interest to select a reason for this procedure from the list below. All of these items fulfill various CMS LCD requirements except the new and changing color options.
Detail Level: Detailed

## 2023-05-02 ENCOUNTER — APPOINTMENT (OUTPATIENT)
Dept: URBAN - NONMETROPOLITAN AREA CLINIC 45 | Age: 68
Setting detail: DERMATOLOGY
End: 2023-05-02

## 2023-05-02 DIAGNOSIS — L30.4 ERYTHEMA INTERTRIGO: ICD-10-CM

## 2023-05-02 DIAGNOSIS — L259 CONTACT DERMATITIS AND OTHER ECZEMA, UNSPECIFIED CAUSE: ICD-10-CM

## 2023-05-02 DIAGNOSIS — L57.0 ACTINIC KERATOSIS: ICD-10-CM

## 2023-05-02 DIAGNOSIS — T1490XA CONTUSION OF UNSPECIFIED SITE: ICD-10-CM

## 2023-05-02 PROBLEM — L30.8 OTHER SPECIFIED DERMATITIS: Status: ACTIVE | Noted: 2023-05-02

## 2023-05-02 PROBLEM — S80.12XA CONTUSION OF LEFT LOWER LEG, INITIAL ENCOUNTER: Status: ACTIVE | Noted: 2023-05-02

## 2023-05-02 PROCEDURE — OTHER REASSURANCE: OTHER

## 2023-05-02 PROCEDURE — OTHER PRESCRIPTION MEDICATION MANAGEMENT: OTHER

## 2023-05-02 PROCEDURE — 17003 DESTRUCT PREMALG LES 2-14: CPT

## 2023-05-02 PROCEDURE — OTHER MIPS QUALITY: OTHER

## 2023-05-02 PROCEDURE — 99213 OFFICE O/P EST LOW 20 MIN: CPT | Mod: 25

## 2023-05-02 PROCEDURE — OTHER LIQUID NITROGEN: OTHER

## 2023-05-02 PROCEDURE — 17000 DESTRUCT PREMALG LESION: CPT

## 2023-05-02 ASSESSMENT — LOCATION ZONE DERM
LOCATION ZONE: FACE
LOCATION ZONE: ARM
LOCATION ZONE: LEG
LOCATION ZONE: HAND

## 2023-05-02 ASSESSMENT — LOCATION DETAILED DESCRIPTION DERM
LOCATION DETAILED: RIGHT RADIAL DORSAL HAND
LOCATION DETAILED: RIGHT DISTAL DORSAL FOREARM
LOCATION DETAILED: RIGHT MEDIAL TEMPLE
LOCATION DETAILED: RIGHT FOREHEAD
LOCATION DETAILED: LEFT PROXIMAL PRETIBIAL REGION
LOCATION DETAILED: LEFT DISTAL DORSAL FOREARM
LOCATION DETAILED: RIGHT ULNAR DORSAL HAND
LOCATION DETAILED: LEFT RADIAL DORSAL HAND

## 2023-05-02 ASSESSMENT — LOCATION SIMPLE DESCRIPTION DERM
LOCATION SIMPLE: RIGHT TEMPLE
LOCATION SIMPLE: LEFT PRETIBIAL REGION
LOCATION SIMPLE: RIGHT FOREHEAD
LOCATION SIMPLE: LEFT FOREARM
LOCATION SIMPLE: RIGHT HAND
LOCATION SIMPLE: LEFT HAND
LOCATION SIMPLE: RIGHT FOREARM

## 2023-05-02 NOTE — PROCEDURE: PRESCRIPTION MEDICATION MANAGEMENT
Detail Level: Zone
Render In Strict Bullet Format?: No
Continue Regimen: Ciclopirox TS twice a day as needed \\nDrying technique \\nZeasorb AF Powder
Continue Regimen: Triamcinolone Cream twice a day 1-2 times a week as needed\\nEmollients( Cerave cream, Aquaphor or Vaseline ) \\nMild cleansers ( Dove Fragrance Free Bar Soap)

## 2023-11-02 ENCOUNTER — APPOINTMENT (OUTPATIENT)
Dept: URBAN - NONMETROPOLITAN AREA CLINIC 45 | Age: 68
Setting detail: DERMATOLOGY
End: 2023-11-02

## 2023-11-02 DIAGNOSIS — L82.0 INFLAMED SEBORRHEIC KERATOSIS: ICD-10-CM

## 2023-11-02 DIAGNOSIS — L57.8 OTHER SKIN CHANGES DUE TO CHRONIC EXPOSURE TO NONIONIZING RADIATION: ICD-10-CM

## 2023-11-02 DIAGNOSIS — L259 CONTACT DERMATITIS AND OTHER ECZEMA, UNSPECIFIED CAUSE: ICD-10-CM

## 2023-11-02 PROBLEM — L30.8 OTHER SPECIFIED DERMATITIS: Status: ACTIVE | Noted: 2023-11-02

## 2023-11-02 PROCEDURE — OTHER MIPS QUALITY: OTHER

## 2023-11-02 PROCEDURE — OTHER COUNSELING: OTHER

## 2023-11-02 PROCEDURE — OTHER PRESCRIPTION MEDICATION MANAGEMENT: OTHER

## 2023-11-02 PROCEDURE — 99213 OFFICE O/P EST LOW 20 MIN: CPT | Mod: 25

## 2023-11-02 PROCEDURE — 17110 DESTRUCT B9 LESION 1-14: CPT

## 2023-11-02 PROCEDURE — OTHER LIQUID NITROGEN: OTHER

## 2023-11-02 ASSESSMENT — LOCATION SIMPLE DESCRIPTION DERM
LOCATION SIMPLE: RIGHT HAND
LOCATION SIMPLE: LEFT FOREARM
LOCATION SIMPLE: LEFT THIGH
LOCATION SIMPLE: LEFT HAND
LOCATION SIMPLE: RIGHT FOREARM
LOCATION SIMPLE: LEFT CHEEK

## 2023-11-02 ASSESSMENT — LOCATION DETAILED DESCRIPTION DERM
LOCATION DETAILED: RIGHT RADIAL DORSAL HAND
LOCATION DETAILED: LEFT DISTAL DORSAL FOREARM
LOCATION DETAILED: LEFT VENTRAL LATERAL PROXIMAL FOREARM
LOCATION DETAILED: RIGHT DISTAL DORSAL FOREARM
LOCATION DETAILED: LEFT ANTERIOR PROXIMAL THIGH
LOCATION DETAILED: LEFT INFERIOR CENTRAL MALAR CHEEK
LOCATION DETAILED: LEFT ULNAR DORSAL HAND
LOCATION DETAILED: RIGHT VENTRAL DISTAL FOREARM

## 2023-11-02 ASSESSMENT — LOCATION ZONE DERM
LOCATION ZONE: ARM
LOCATION ZONE: FACE
LOCATION ZONE: HAND
LOCATION ZONE: LEG

## 2023-11-02 NOTE — PROCEDURE: PRESCRIPTION MEDICATION MANAGEMENT
Continue Regimen: Triamcinolone Cream twice a day 1-2 times a week as needed\\nContinue to apply Cerave cream, Aquaphor or Vaseline as needed.\\nContinue to use Mild cleansers
Detail Level: Zone
Render In Strict Bullet Format?: No